# Patient Record
Sex: FEMALE | URBAN - METROPOLITAN AREA
[De-identification: names, ages, dates, MRNs, and addresses within clinical notes are randomized per-mention and may not be internally consistent; named-entity substitution may affect disease eponyms.]

---

## 2019-04-09 ENCOUNTER — APPOINTMENT (RX ONLY)
Dept: URBAN - METROPOLITAN AREA CLINIC 141 | Facility: CLINIC | Age: 54
Setting detail: DERMATOLOGY
End: 2019-04-09

## 2019-04-09 DIAGNOSIS — L71.0 PERIORAL DERMATITIS: ICD-10-CM | Status: INADEQUATELY CONTROLLED

## 2019-04-09 PROCEDURE — ? PRESCRIPTION

## 2019-04-09 PROCEDURE — 99202 OFFICE O/P NEW SF 15 MIN: CPT

## 2019-04-09 PROCEDURE — ? COUNSELING

## 2019-04-09 PROCEDURE — ? TREATMENT REGIMEN

## 2019-04-09 ASSESSMENT — LOCATION ZONE DERM
LOCATION ZONE: FACE
LOCATION ZONE: LIP

## 2019-04-09 ASSESSMENT — LOCATION DETAILED DESCRIPTION DERM
LOCATION DETAILED: RIGHT LOWER CUTANEOUS LIP
LOCATION DETAILED: RIGHT CHIN
LOCATION DETAILED: LEFT UPPER CUTANEOUS LIP
LOCATION DETAILED: RIGHT INFERIOR MEDIAL MALAR CHEEK
LOCATION DETAILED: LEFT LOWER CUTANEOUS LIP

## 2019-04-09 ASSESSMENT — INVESTIGATOR STATIC GLOBAL ASSESSMENT
IN YOUR EXPERIENCE, AMONG ALL PATIENTS YOU HAVE SEEN WITH THIS CONDITION, HOW SEVERE IS THIS PATIENT'S CONDITION?: MODERATE

## 2019-04-09 ASSESSMENT — LOCATION SIMPLE DESCRIPTION DERM
LOCATION SIMPLE: LEFT LIP
LOCATION SIMPLE: CHIN
LOCATION SIMPLE: RIGHT LIP
LOCATION SIMPLE: RIGHT CHEEK

## 2019-04-09 NOTE — HPI: RASH
What Type Of Note Output Would You Prefer (Optional)?: Standard Output
How Severe Is Your Rash?: moderate
Is This A New Presentation, Or A Follow-Up?: Rash
Additional History: Patient states she was seen at urgent care in February was given a course of Keflex. Recently has been using over the counter hydrocortisone.

## 2019-04-09 NOTE — PROCEDURE: TREATMENT REGIMEN
Initiate Treatment: doxycycline hyclate 100 mg tablet: Take one tablet by mouth twice daily for one month \\nmetronidazole 0.75 % topical cream: Apply to affected area on around mouth twice a day
Detail Level: Detailed

## 2019-04-09 NOTE — PROCEDURE: MIPS QUALITY
Quality 111:Pneumonia Vaccination Status For Older Adults: Pneumococcal Vaccination not Administered or Previously Received, Reason not Otherwise Specified
Quality 154 Part B: Falls: Risk Screening (Should Be Reported With Measure 155.): Patient screened for future fall risk; documentation of no falls in the past year or only one fall without injury in the past year
Quality 226: Preventive Care And Screening: Tobacco Use: Screening And Cessation Intervention: Patient screened for tobacco use and is an ex/non-smoker
Quality 131: Pain Assessment And Follow-Up: Pain assessment using a standardized tool is documented as negative, no follow-up plan required
Quality 155 (Denominator): Falls Plan Of Care: Plan of Care not Documented, Reason not Otherwise Specified
Quality 154 Part A: Falls: Risk Assessment (Should Be Reported With Measure 155.): Falls risk assessment completed and documented in the past 12 months.
Detail Level: Detailed
Quality 110: Preventive Care And Screening: Influenza Immunization: Influenza Immunization not Administered for Documented Reasons.
Quality 47: Advance Care Plan: Advance Care Planning discussed and documented in the medical record; patient did not wish or was not able to name a surrogate decision maker or provide an advance care plan.
Quality 431: Preventive Care And Screening: Unhealthy Alcohol Use - Screening: Patient screened for unhealthy alcohol use using a single question and scores less than 2 times per year

## 2019-04-10 RX ORDER — METRONIDAZOLE 7.5 MG/G
CREAM TOPICAL TWICE A DAY
Qty: 1 | Refills: 1 | Status: ERX | COMMUNITY
Start: 2019-04-10

## 2019-04-10 RX ORDER — DOXYCYCLINE HYCLATE 100 MG/1
CAPSULE, GELATIN COATED ORAL
Qty: 60 | Refills: 0 | Status: ERX | COMMUNITY
Start: 2019-04-10

## 2019-04-10 RX ADMIN — METRONIDAZOLE: 7.5 CREAM TOPICAL at 00:25

## 2019-04-10 RX ADMIN — DOXYCYCLINE HYCLATE: 100 CAPSULE, GELATIN COATED ORAL at 00:32

## 2019-04-30 ENCOUNTER — APPOINTMENT (RX ONLY)
Dept: URBAN - METROPOLITAN AREA CLINIC 141 | Facility: CLINIC | Age: 54
Setting detail: DERMATOLOGY
End: 2019-04-30

## 2019-04-30 DIAGNOSIS — L71.0 PERIORAL DERMATITIS: ICD-10-CM | Status: IMPROVED

## 2019-04-30 PROCEDURE — ? TREATMENT REGIMEN

## 2019-04-30 PROCEDURE — ? PRESCRIPTION

## 2019-04-30 PROCEDURE — ? COUNSELING

## 2019-04-30 PROCEDURE — 99213 OFFICE O/P EST LOW 20 MIN: CPT

## 2019-04-30 RX ORDER — DOXYCYCLINE HYCLATE 100 MG/1
CAPSULE, GELATIN COATED ORAL
Qty: 14 | Refills: 0 | Status: ERX

## 2019-04-30 ASSESSMENT — LOCATION DETAILED DESCRIPTION DERM
LOCATION DETAILED: RIGHT CHIN
LOCATION DETAILED: RIGHT LOWER CUTANEOUS LIP
LOCATION DETAILED: LEFT UPPER CUTANEOUS LIP
LOCATION DETAILED: LEFT LOWER CUTANEOUS LIP
LOCATION DETAILED: RIGHT INFERIOR MEDIAL MALAR CHEEK

## 2019-04-30 ASSESSMENT — LOCATION SIMPLE DESCRIPTION DERM
LOCATION SIMPLE: RIGHT LIP
LOCATION SIMPLE: RIGHT CHEEK
LOCATION SIMPLE: LEFT LIP
LOCATION SIMPLE: CHIN

## 2019-04-30 ASSESSMENT — LOCATION ZONE DERM
LOCATION ZONE: LIP
LOCATION ZONE: FACE

## 2019-04-30 NOTE — PROCEDURE: TREATMENT REGIMEN
Continue Regimen: doxycycline hyclate 100 mg tablet: Take one tablet by mouth twice daily for one more week, then decrease to one capsule by mouth once daily for two weeks.\\nmetronidazole 0.75 % topical cream: Apply to affected areas twice a day for two weeks, then decrease to applying cream to once daily for one week
Detail Level: Detailed

## 2019-04-30 NOTE — PROCEDURE: MIPS QUALITY
Quality 154 Part A: Falls: Risk Assessment (Should Be Reported With Measure 155.): Falls risk assessment completed and documented in the past 12 months.
Quality 111:Pneumonia Vaccination Status For Older Adults: Pneumococcal Vaccination not Administered or Previously Received, Reason not Otherwise Specified
Quality 154 Part B: Falls: Risk Screening (Should Be Reported With Measure 155.): Patient screened for future fall risk; documentation of no falls in the past year or only one fall without injury in the past year
Quality 431: Preventive Care And Screening: Unhealthy Alcohol Use - Screening: Patient screened for unhealthy alcohol use using a single question and scores less than 2 times per year
Quality 47: Advance Care Plan: Advance Care Planning discussed and documented in the medical record; patient did not wish or was not able to name a surrogate decision maker or provide an advance care plan.
Quality 110: Preventive Care And Screening: Influenza Immunization: Influenza Immunization not Administered for Documented Reasons.
Quality 131: Pain Assessment And Follow-Up: Pain assessment using a standardized tool is documented as negative, no follow-up plan required
Quality 226: Preventive Care And Screening: Tobacco Use: Screening And Cessation Intervention: Patient screened for tobacco use and is an ex/non-smoker
Detail Level: Detailed
Quality 155 (Denominator): Falls Plan Of Care: Plan of Care not Documented, Reason not Otherwise Specified

## 2021-02-18 ENCOUNTER — APPOINTMENT (RX ONLY)
Dept: URBAN - METROPOLITAN AREA CLINIC 176 | Facility: CLINIC | Age: 56
Setting detail: DERMATOLOGY
End: 2021-02-18

## 2021-02-18 VITALS — HEIGHT: 55 IN | WEIGHT: 162 LBS

## 2021-02-18 DIAGNOSIS — D18.0 HEMANGIOMA: ICD-10-CM

## 2021-02-18 DIAGNOSIS — L70.8 OTHER ACNE: ICD-10-CM

## 2021-02-18 DIAGNOSIS — L71.8 OTHER ROSACEA: ICD-10-CM | Status: INADEQUATELY CONTROLLED

## 2021-02-18 DIAGNOSIS — L57.8 OTHER SKIN CHANGES DUE TO CHRONIC EXPOSURE TO NONIONIZING RADIATION: ICD-10-CM

## 2021-02-18 DIAGNOSIS — L82.1 OTHER SEBORRHEIC KERATOSIS: ICD-10-CM

## 2021-02-18 DIAGNOSIS — Z87.2 PERSONAL HISTORY OF DISEASES OF THE SKIN AND SUBCUTANEOUS TISSUE: ICD-10-CM

## 2021-02-18 PROBLEM — D18.01 HEMANGIOMA OF SKIN AND SUBCUTANEOUS TISSUE: Status: ACTIVE | Noted: 2021-02-18

## 2021-02-18 PROCEDURE — ? COUNSELING

## 2021-02-18 PROCEDURE — ? TREATMENT REGIMEN

## 2021-02-18 PROCEDURE — ? PRESCRIPTION

## 2021-02-18 PROCEDURE — 99214 OFFICE O/P EST MOD 30 MIN: CPT

## 2021-02-18 PROCEDURE — ? OBSERVATION

## 2021-02-18 RX ORDER — SODIUM SULFACETAMIDE AND SULFUR 80; 40 MG/ML; MG/ML
LOTION TOPICAL BID
Qty: 1 | Refills: 11 | Status: ERX | COMMUNITY
Start: 2021-02-18

## 2021-02-18 RX ORDER — METRONIDAZOLE 7.5 MG/G
GEL TOPICAL BID
Qty: 1 | Refills: 11 | Status: ERX | COMMUNITY
Start: 2021-02-18

## 2021-02-18 RX ADMIN — SODIUM SULFACETAMIDE AND SULFUR: 80; 40 LOTION TOPICAL at 00:00

## 2021-02-18 RX ADMIN — METRONIDAZOLE: 7.5 GEL TOPICAL at 00:00

## 2021-02-18 ASSESSMENT — LOCATION ZONE DERM
LOCATION ZONE: NOSE
LOCATION ZONE: FACE
LOCATION ZONE: TRUNK

## 2021-02-18 ASSESSMENT — LOCATION DETAILED DESCRIPTION DERM
LOCATION DETAILED: SUPERIOR LUMBAR SPINE
LOCATION DETAILED: RIGHT INFERIOR MEDIAL UPPER BACK
LOCATION DETAILED: RIGHT MID-UPPER BACK
LOCATION DETAILED: LEFT INFERIOR MEDIAL MALAR CHEEK
LOCATION DETAILED: NASAL DORSUM
LOCATION DETAILED: LEFT SUPERIOR LATERAL MIDBACK
LOCATION DETAILED: RIGHT SUPERIOR UPPER BACK
LOCATION DETAILED: RIGHT INFERIOR CENTRAL MALAR CHEEK
LOCATION DETAILED: RIGHT RIB CAGE

## 2021-02-18 ASSESSMENT — LOCATION SIMPLE DESCRIPTION DERM
LOCATION SIMPLE: LOWER BACK
LOCATION SIMPLE: ABDOMEN
LOCATION SIMPLE: LEFT LOWER BACK
LOCATION SIMPLE: NOSE
LOCATION SIMPLE: RIGHT UPPER BACK
LOCATION SIMPLE: RIGHT CHEEK
LOCATION SIMPLE: LEFT CHEEK

## 2021-02-18 ASSESSMENT — PAIN INTENSITY VAS: HOW INTENSE IS YOUR PAIN 0 BEING NO PAIN, 10 BEING THE MOST SEVERE PAIN POSSIBLE?: NO PAIN

## 2021-02-18 ASSESSMENT — SEVERITY ASSESSMENT OVERALL AMONG ALL PATIENTS
IN YOUR EXPERIENCE, AMONG ALL PATIENTS YOU HAVE SEEN WITH THIS CONDITION, HOW SEVERE IS THIS PATIENT'S CONDITION?: MILD TO MODERATE

## 2021-02-18 NOTE — PROCEDURE: OBSERVATION
Body Location Override (Optional - Billing Will Still Be Based On Selected Body Map Location If Applicable): right upper back
Detail Level: Detailed
Size Of Lesion In Cm (Optional): 0
Morphology Per Location (Optional): 6/22/17 Mild treated with bx

## 2021-06-16 ENCOUNTER — APPOINTMENT (RX ONLY)
Dept: URBAN - METROPOLITAN AREA CLINIC 176 | Facility: CLINIC | Age: 56
Setting detail: DERMATOLOGY
End: 2021-06-16

## 2021-06-16 VITALS — WEIGHT: 165 LBS | HEIGHT: 68 IN

## 2021-06-16 DIAGNOSIS — L23.9 ALLERGIC CONTACT DERMATITIS, UNSPECIFIED CAUSE: ICD-10-CM | Status: INADEQUATELY CONTROLLED

## 2021-06-16 DIAGNOSIS — L71.8 OTHER ROSACEA: ICD-10-CM | Status: INADEQUATELY CONTROLLED

## 2021-06-16 DIAGNOSIS — L20.89 OTHER ATOPIC DERMATITIS: ICD-10-CM

## 2021-06-16 PROBLEM — L30.9 DERMATITIS, UNSPECIFIED: Status: ACTIVE | Noted: 2021-06-16

## 2021-06-16 PROCEDURE — 99214 OFFICE O/P EST MOD 30 MIN: CPT

## 2021-06-16 PROCEDURE — ? PRESCRIPTION

## 2021-06-16 PROCEDURE — ? TREATMENT REGIMEN

## 2021-06-16 PROCEDURE — ? COUNSELING

## 2021-06-16 RX ORDER — HYDROCORTISONE 25 MG/G
OINTMENT TOPICAL
Qty: 1 | Refills: 0 | Status: ERX | COMMUNITY
Start: 2021-06-16

## 2021-06-16 RX ADMIN — HYDROCORTISONE: 25 OINTMENT TOPICAL at 00:00

## 2021-06-16 ASSESSMENT — LOCATION SIMPLE DESCRIPTION DERM
LOCATION SIMPLE: RIGHT UPPER ARM
LOCATION SIMPLE: LEFT CHEEK
LOCATION SIMPLE: RIGHT EYEBROW
LOCATION SIMPLE: RIGHT CHEEK
LOCATION SIMPLE: LEFT EYEBROW

## 2021-06-16 ASSESSMENT — LOCATION DETAILED DESCRIPTION DERM
LOCATION DETAILED: RIGHT SUPERIOR CENTRAL MALAR CHEEK
LOCATION DETAILED: RIGHT ANTERIOR DISTAL UPPER ARM
LOCATION DETAILED: RIGHT LATERAL EYEBROW
LOCATION DETAILED: LEFT LATERAL EYEBROW
LOCATION DETAILED: LEFT MEDIAL BUCCAL CHEEK
LOCATION DETAILED: LEFT SUPERIOR CENTRAL MALAR CHEEK

## 2021-06-16 ASSESSMENT — SEVERITY ASSESSMENT 2020: SEVERITY 2020: MILD

## 2021-06-16 ASSESSMENT — LOCATION ZONE DERM
LOCATION ZONE: FACE
LOCATION ZONE: ARM

## 2021-06-16 NOTE — PROCEDURE: TREATMENT REGIMEN
Detail Level: Simple
Plan: Pt did not previously start metronidazole \\nPt stopped sulfacleanse when her eyelids started getting irritated
Initiate Treatment: Metronidazole
Samples Given: Vanicream soap/lotion and free and clear shampoo/conditioner
Discontinue Regimen: Eye drops
Initiate Treatment: Hydrocortisone
Initiate Treatment: Hydrocortisone ointment
Plan: Pt stopped scented soap
Samples Given: Vanicream soap and lotion

## 2021-07-07 ENCOUNTER — APPOINTMENT (RX ONLY)
Dept: URBAN - METROPOLITAN AREA CLINIC 176 | Facility: CLINIC | Age: 56
Setting detail: DERMATOLOGY
End: 2021-07-07

## 2021-07-07 VITALS — HEIGHT: 69 IN | WEIGHT: 166 LBS

## 2021-07-07 DIAGNOSIS — L71.8 OTHER ROSACEA: ICD-10-CM | Status: INADEQUATELY CONTROLLED

## 2021-07-07 DIAGNOSIS — L23.9 ALLERGIC CONTACT DERMATITIS, UNSPECIFIED CAUSE: ICD-10-CM | Status: IMPROVED

## 2021-07-07 DIAGNOSIS — L20.89 OTHER ATOPIC DERMATITIS: ICD-10-CM | Status: RESOLVED

## 2021-07-07 PROBLEM — L30.9 DERMATITIS, UNSPECIFIED: Status: ACTIVE | Noted: 2021-07-07

## 2021-07-07 PROCEDURE — ? COUNSELING

## 2021-07-07 PROCEDURE — 99214 OFFICE O/P EST MOD 30 MIN: CPT

## 2021-07-07 PROCEDURE — ? PRESCRIPTION SAMPLES PROVIDED

## 2021-07-07 PROCEDURE — ? TREATMENT REGIMEN

## 2021-07-07 ASSESSMENT — LOCATION ZONE DERM
LOCATION ZONE: FACE
LOCATION ZONE: ARM

## 2021-07-07 ASSESSMENT — LOCATION SIMPLE DESCRIPTION DERM
LOCATION SIMPLE: RIGHT CHEEK
LOCATION SIMPLE: LEFT EYEBROW
LOCATION SIMPLE: LEFT CHEEK
LOCATION SIMPLE: RIGHT EYEBROW
LOCATION SIMPLE: RIGHT UPPER ARM

## 2021-07-07 ASSESSMENT — LOCATION DETAILED DESCRIPTION DERM
LOCATION DETAILED: RIGHT ANTERIOR DISTAL UPPER ARM
LOCATION DETAILED: LEFT MEDIAL BUCCAL CHEEK
LOCATION DETAILED: RIGHT SUPERIOR CENTRAL MALAR CHEEK
LOCATION DETAILED: RIGHT LATERAL EYEBROW
LOCATION DETAILED: LEFT LATERAL EYEBROW

## 2021-07-07 NOTE — PROCEDURE: TREATMENT REGIMEN
Detail Level: Simple
Continue Regimen: Metronidazole gelQD\\nSulfacleanse
Plan: Pt has been using Sulfacleanse and metronidazole and states she has seen some improvement. \\nPt does not want to do oral abx at this time
Initiate Treatment: Finacea QD
Modify Regimen: Resolved w/ hydrocortisone. Pt can use prn
Plan: Pt stopped scented soap
Samples Given: Vanicream soap and lotion
Samples Given: Vanicream soap/lotion and free and clear shampoo/conditioner/ lotions
Plan: Pt stopped eye drops and started some that are for sensitive eyes. Pt has dry eye and cannot go w/o eye drops.
Otc Regimen: Suggest pt try Claritin daily
Discontinue Regimen: Eye drops
Continue Regimen: Hydrocortisone QHS x 5 nights

## 2021-07-29 ENCOUNTER — APPOINTMENT (RX ONLY)
Dept: URBAN - METROPOLITAN AREA CLINIC 176 | Facility: CLINIC | Age: 56
Setting detail: DERMATOLOGY
End: 2021-07-29

## 2021-07-29 VITALS — WEIGHT: 293 LBS | HEIGHT: 55 IN

## 2021-07-29 DIAGNOSIS — L23.9 ALLERGIC CONTACT DERMATITIS, UNSPECIFIED CAUSE: ICD-10-CM

## 2021-07-29 DIAGNOSIS — L71.8 OTHER ROSACEA: ICD-10-CM

## 2021-07-29 PROBLEM — L30.9 DERMATITIS, UNSPECIFIED: Status: ACTIVE | Noted: 2021-07-29

## 2021-07-29 PROCEDURE — ? COUNSELING

## 2021-07-29 PROCEDURE — ? PRESCRIPTION

## 2021-07-29 PROCEDURE — 99214 OFFICE O/P EST MOD 30 MIN: CPT

## 2021-07-29 PROCEDURE — ? TREATMENT REGIMEN

## 2021-07-29 PROCEDURE — ? ADDITIONAL NOTES

## 2021-07-29 RX ORDER — TACROLIMUS 1 MG/G
OINTMENT TOPICAL BID
Qty: 1 | Refills: 3 | Status: ERX | COMMUNITY
Start: 2021-07-29

## 2021-07-29 RX ORDER — DOXYCYCLINE 100 MG/1
CAPSULE ORAL QD
Qty: 30 | Refills: 1 | Status: ERX

## 2021-07-29 RX ADMIN — TACROLIMUS: 1 OINTMENT TOPICAL at 00:00

## 2021-07-29 ASSESSMENT — LOCATION ZONE DERM
LOCATION ZONE: EYELID
LOCATION ZONE: FACE
LOCATION ZONE: LIP

## 2021-07-29 ASSESSMENT — LOCATION DETAILED DESCRIPTION DERM
LOCATION DETAILED: RIGHT LOWER CUTANEOUS LIP
LOCATION DETAILED: RIGHT SUPERIOR CENTRAL MALAR CHEEK
LOCATION DETAILED: RIGHT LATERAL EYEBROW
LOCATION DETAILED: RIGHT SUPERIOR LATERAL MALAR CHEEK
LOCATION DETAILED: LEFT LATERAL EYEBROW
LOCATION DETAILED: RIGHT LATERAL CANTHUS
LOCATION DETAILED: LEFT MEDIAL BUCCAL CHEEK

## 2021-07-29 ASSESSMENT — LOCATION SIMPLE DESCRIPTION DERM
LOCATION SIMPLE: RIGHT CHEEK
LOCATION SIMPLE: LEFT EYEBROW
LOCATION SIMPLE: RIGHT EYEBROW
LOCATION SIMPLE: RIGHT LIP
LOCATION SIMPLE: RIGHT EYELID
LOCATION SIMPLE: LEFT CHEEK

## 2021-07-29 NOTE — PROCEDURE: TREATMENT REGIMEN
Detail Level: Simple
Continue Regimen: Metronidazole gelQD\\nSulfacleanse
Samples Given: Vanicream facial cleanser and lotion
Plan: Pt decline IMK, hopefully can get in for patch testing soon
Discontinue Regimen: Eye drops, makeup, her facewash
Continue Regimen: Hydrocortisone BID no more than 2 weeks

## 2021-07-29 NOTE — PROCEDURE: ADDITIONAL NOTES
Detail Level: Simple
Additional Notes: Ct the hydrocortisone for a couple weeks, see if the protopic is covered if covered we will switch to this, I want her to stop eye drops, makeup and current facewashes, Smaples of vanicream face wash and moisterizer given today. [facility] Handout given.
Render Risk Assessment In Note?: no

## 2021-09-09 ENCOUNTER — APPOINTMENT (RX ONLY)
Dept: URBAN - METROPOLITAN AREA CLINIC 176 | Facility: CLINIC | Age: 56
Setting detail: DERMATOLOGY
End: 2021-09-09

## 2021-09-09 VITALS — WEIGHT: 167 LBS | HEIGHT: 69 IN

## 2021-09-09 DIAGNOSIS — L23.9 ALLERGIC CONTACT DERMATITIS, UNSPECIFIED CAUSE: ICD-10-CM

## 2021-09-09 DIAGNOSIS — L71.8 OTHER ROSACEA: ICD-10-CM

## 2021-09-09 PROBLEM — L30.9 DERMATITIS, UNSPECIFIED: Status: ACTIVE | Noted: 2021-09-09

## 2021-09-09 PROCEDURE — ? TREATMENT REGIMEN

## 2021-09-09 PROCEDURE — ? COUNSELING

## 2021-09-09 PROCEDURE — 99213 OFFICE O/P EST LOW 20 MIN: CPT

## 2021-09-09 ASSESSMENT — LOCATION ZONE DERM
LOCATION ZONE: FACE
LOCATION ZONE: LIP

## 2021-09-09 ASSESSMENT — LOCATION DETAILED DESCRIPTION DERM
LOCATION DETAILED: LEFT MEDIAL BUCCAL CHEEK
LOCATION DETAILED: RIGHT SUPERIOR CENTRAL MALAR CHEEK
LOCATION DETAILED: LEFT LATERAL EYEBROW
LOCATION DETAILED: RIGHT LATERAL EYEBROW
LOCATION DETAILED: RIGHT LOWER CUTANEOUS LIP

## 2021-09-09 ASSESSMENT — LOCATION SIMPLE DESCRIPTION DERM
LOCATION SIMPLE: LEFT CHEEK
LOCATION SIMPLE: RIGHT EYEBROW
LOCATION SIMPLE: LEFT EYEBROW
LOCATION SIMPLE: RIGHT CHEEK
LOCATION SIMPLE: RIGHT LIP

## 2021-09-09 NOTE — PROCEDURE: TREATMENT REGIMEN
Detail Level: Simple
Continue Regimen: Metronidazole gelQD\\nSulfacleanse
Plan: Pt may have stopped doxy too early did not get the refill only took for one month and about 1 week after stopping has reflare, we will have her go back on stated she tolerated it well. And try the protopic/tacrolimus on the lower face.
Modify Regimen: DO NOT USE HC on the lower face
Initiate Treatment: Protopic to face QD, DOxy 100 QD
Samples Given: Vanicream facial cleanser and lotion
Plan: Eyes have cleared, she wants to re-try eye drops discussed she can but if she reflares then to stop ASAP and see her eye doctor for different drops as that means she has an allergy to something in those specific drops! \\n\\n\\nLN:Pt decline IMK, hopefully can get in for patch testing soon
Discontinue Regimen: Eye drops, makeup, her facewash
Modify Regimen: HC when flared 1-2 x daily for one week then switch to protopic , can use protopic as maintenance

## 2021-10-20 ENCOUNTER — APPOINTMENT (RX ONLY)
Dept: URBAN - METROPOLITAN AREA CLINIC 176 | Facility: CLINIC | Age: 56
Setting detail: DERMATOLOGY
End: 2021-10-20

## 2021-10-20 VITALS — HEIGHT: 69 IN | WEIGHT: 169 LBS

## 2021-10-20 DIAGNOSIS — L81.4 OTHER MELANIN HYPERPIGMENTATION: ICD-10-CM

## 2021-10-20 DIAGNOSIS — L57.8 OTHER SKIN CHANGES DUE TO CHRONIC EXPOSURE TO NONIONIZING RADIATION: ICD-10-CM

## 2021-10-20 DIAGNOSIS — L71.8 OTHER ROSACEA: ICD-10-CM | Status: WELL CONTROLLED

## 2021-10-20 DIAGNOSIS — L82.1 OTHER SEBORRHEIC KERATOSIS: ICD-10-CM

## 2021-10-20 DIAGNOSIS — Z87.2 PERSONAL HISTORY OF DISEASES OF THE SKIN AND SUBCUTANEOUS TISSUE: ICD-10-CM

## 2021-10-20 DIAGNOSIS — R23.3 SPONTANEOUS ECCHYMOSES: ICD-10-CM | Status: RESOLVED

## 2021-10-20 DIAGNOSIS — L23.9 ALLERGIC CONTACT DERMATITIS, UNSPECIFIED CAUSE: ICD-10-CM

## 2021-10-20 DIAGNOSIS — D22 MELANOCYTIC NEVI: ICD-10-CM

## 2021-10-20 PROBLEM — L30.9 DERMATITIS, UNSPECIFIED: Status: ACTIVE | Noted: 2021-10-20

## 2021-10-20 PROBLEM — D22.5 MELANOCYTIC NEVI OF TRUNK: Status: ACTIVE | Noted: 2021-10-20

## 2021-10-20 PROBLEM — D22.61 MELANOCYTIC NEVI OF RIGHT UPPER LIMB, INCLUDING SHOULDER: Status: ACTIVE | Noted: 2021-10-20

## 2021-10-20 PROCEDURE — ? OBSERVATION

## 2021-10-20 PROCEDURE — ? COUNSELING

## 2021-10-20 PROCEDURE — 99213 OFFICE O/P EST LOW 20 MIN: CPT

## 2021-10-20 PROCEDURE — ? TREATMENT REGIMEN

## 2021-10-20 ASSESSMENT — LOCATION DETAILED DESCRIPTION DERM
LOCATION DETAILED: LEFT RIB CAGE
LOCATION DETAILED: RIGHT SUPERIOR CENTRAL MALAR CHEEK
LOCATION DETAILED: RIGHT POSTERIOR SHOULDER
LOCATION DETAILED: RIGHT ANTERIOR MEDIAL PROXIMAL UPPER ARM
LOCATION DETAILED: RIGHT LOWER CUTANEOUS LIP
LOCATION DETAILED: LEFT LATERAL EYEBROW
LOCATION DETAILED: RIGHT SUPERIOR UPPER BACK
LOCATION DETAILED: RIGHT LATERAL EYEBROW
LOCATION DETAILED: LEFT MEDIAL BUCCAL CHEEK
LOCATION DETAILED: LEFT INFERIOR UPPER BACK
LOCATION DETAILED: LEFT LATERAL SUPERIOR CHEST
LOCATION DETAILED: RIGHT SUPERIOR MEDIAL MIDBACK

## 2021-10-20 ASSESSMENT — LOCATION SIMPLE DESCRIPTION DERM
LOCATION SIMPLE: CHEST
LOCATION SIMPLE: RIGHT SHOULDER
LOCATION SIMPLE: RIGHT CHEEK
LOCATION SIMPLE: LEFT UPPER BACK
LOCATION SIMPLE: RIGHT LIP
LOCATION SIMPLE: RIGHT UPPER ARM
LOCATION SIMPLE: RIGHT UPPER BACK
LOCATION SIMPLE: RIGHT LOWER BACK
LOCATION SIMPLE: ABDOMEN
LOCATION SIMPLE: LEFT CHEEK
LOCATION SIMPLE: LEFT EYEBROW
LOCATION SIMPLE: RIGHT EYEBROW

## 2021-10-20 ASSESSMENT — LOCATION ZONE DERM
LOCATION ZONE: FACE
LOCATION ZONE: TRUNK
LOCATION ZONE: ARM
LOCATION ZONE: ARM
LOCATION ZONE: LIP

## 2021-10-20 NOTE — PROCEDURE: TREATMENT REGIMEN
Detail Level: Simple
Continue Regimen: Sulfacleanse
Modify Regimen: DO NOT USE HC on the lower face
Initiate Treatment: Tacrolimus to face PRN flares
Samples Given: Vanicream facial cleanser and lotion
Plan: Some dryness around the are eyes, recommend moisturization. \\n\\nLN: \\nEyes have cleared, she wants to re-try eye drops discussed she can but if she reflares then to stop ASAP and see her eye doctor for different drops as that means she has an allergy to something in those specific drops! \\n\\n\\nLN:Pt decline IMK, hopefully can get in for patch testing soon
Discontinue Regimen: Eye drops, makeup, her facewash
Modify Regimen: HC when flared 1-2 x daily for one week then switch to protopic , can use protopic as maintenance

## 2021-10-20 NOTE — HPI: EVALUATION OF SKIN LESION(S)
What Type Of Note Output Would You Prefer (Optional)?: Bullet Format
How Severe Are Your Spot(S)?: mild
Have Your Spot(S) Been Treated In The Past?: has been treated
Hpi Title: Evaluation of a Skin Lesion
Additional History: Used tacrolimus ointment and cleared it up. No longer in discomfort. UBE.

## 2021-10-20 NOTE — PROCEDURE: COUNSELING
Detail Level: Zone
Detail Level: Detailed
Patient Specific Counseling (Will Not Stick From Patient To Patient): Patient had showed pictures on phone, recommended to come if reoccurring.
Detail Level: Generalized

## 2021-11-05 ENCOUNTER — APPOINTMENT (RX ONLY)
Dept: URBAN - METROPOLITAN AREA CLINIC 176 | Facility: CLINIC | Age: 56
Setting detail: DERMATOLOGY
End: 2021-11-05

## 2021-11-05 VITALS — WEIGHT: 164 LBS | HEIGHT: 69 IN

## 2021-11-05 DIAGNOSIS — L01.01 NON-BULLOUS IMPETIGO: ICD-10-CM

## 2021-11-05 DIAGNOSIS — L71.8 OTHER ROSACEA: ICD-10-CM

## 2021-11-05 DIAGNOSIS — L57.8 OTHER SKIN CHANGES DUE TO CHRONIC EXPOSURE TO NONIONIZING RADIATION: ICD-10-CM

## 2021-11-05 PROBLEM — L30.9 DERMATITIS, UNSPECIFIED: Status: ACTIVE | Noted: 2021-11-05

## 2021-11-05 PROCEDURE — ? ADDITIONAL NOTES

## 2021-11-05 PROCEDURE — ? ORDER TESTS

## 2021-11-05 PROCEDURE — ? COUNSELING

## 2021-11-05 PROCEDURE — ? TREATMENT REGIMEN

## 2021-11-05 PROCEDURE — ? PRESCRIPTION

## 2021-11-05 PROCEDURE — 99213 OFFICE O/P EST LOW 20 MIN: CPT

## 2021-11-05 RX ORDER — MUPIROCIN 20 MG/G
OINTMENT TOPICAL BID
Qty: 22 | Refills: 1 | Status: ERX | COMMUNITY
Start: 2021-11-05

## 2021-11-05 RX ORDER — DOXYCYCLINE 100 MG/1
CAPSULE ORAL
Qty: 60 | Refills: 1 | Status: ERX

## 2021-11-05 RX ADMIN — MUPIROCIN: 20 OINTMENT TOPICAL at 00:00

## 2021-11-05 ASSESSMENT — LOCATION SIMPLE DESCRIPTION DERM
LOCATION SIMPLE: RIGHT LIP
LOCATION SIMPLE: CHIN
LOCATION SIMPLE: LEFT FOREHEAD
LOCATION SIMPLE: LEFT CHEEK

## 2021-11-05 ASSESSMENT — LOCATION DETAILED DESCRIPTION DERM
LOCATION DETAILED: RIGHT LOWER CUTANEOUS LIP
LOCATION DETAILED: RIGHT CHIN
LOCATION DETAILED: LEFT MEDIAL FOREHEAD
LOCATION DETAILED: LEFT MEDIAL BUCCAL CHEEK

## 2021-11-05 ASSESSMENT — LOCATION ZONE DERM
LOCATION ZONE: FACE
LOCATION ZONE: LIP

## 2021-11-05 NOTE — PROCEDURE: TREATMENT REGIMEN
Detail Level: Simple
Plan: May restart on tacrolimus and sulfacleanse once other rash is calmed down. We will also taper SLOWLY off of DOXYCYLINE. We will go from 200 QD to 100 then to 50 then to 40 as she did mention she flares when she stops cold turkey.
Modify Regimen: DO NOT USE HC on the lower face
Discontinue Regimen: Hold tacrolimus and sulfacleanse for right now, very inflammed and irritated today with some yellow crusting so maybe some infection there today unsure what triggered this was clear a couple weeks ago this is something acute

## 2021-11-05 NOTE — PROCEDURE: ORDER TESTS
Clinical Notes (To The Lab): Add sensitivity
Bill For Surgical Tray: no
Billing Type: Third-Party Bill
Expected Date Of Service: 11/05/2021
Performing Laboratory: [id number]

## 2021-11-05 NOTE — PROCEDURE: ADDITIONAL NOTES
Detail Level: Detailed
Render Risk Assessment In Note?: no
Additional Notes: Pt concern with her fleece sheets as she states this is the only new exposure. Change sheets until we get this figured out. Discussed I want her to get patch tested to see if there is something else she is coming into contact with that is causing these rashes.

## 2021-11-12 ENCOUNTER — APPOINTMENT (RX ONLY)
Dept: URBAN - METROPOLITAN AREA CLINIC 176 | Facility: CLINIC | Age: 56
Setting detail: DERMATOLOGY
End: 2021-11-12

## 2021-11-12 VITALS — WEIGHT: 165 LBS | HEIGHT: 69 IN

## 2021-11-12 DIAGNOSIS — L71.8 OTHER ROSACEA: ICD-10-CM

## 2021-11-12 DIAGNOSIS — L57.8 OTHER SKIN CHANGES DUE TO CHRONIC EXPOSURE TO NONIONIZING RADIATION: ICD-10-CM

## 2021-11-12 DIAGNOSIS — L01.01 NON-BULLOUS IMPETIGO: ICD-10-CM | Status: RESOLVING

## 2021-11-12 PROBLEM — L30.9 DERMATITIS, UNSPECIFIED: Status: ACTIVE | Noted: 2021-11-12

## 2021-11-12 PROCEDURE — ? TREATMENT REGIMEN

## 2021-11-12 PROCEDURE — ? COUNSELING

## 2021-11-12 PROCEDURE — 99213 OFFICE O/P EST LOW 20 MIN: CPT

## 2021-11-12 PROCEDURE — ? ADDITIONAL NOTES

## 2021-11-12 ASSESSMENT — LOCATION SIMPLE DESCRIPTION DERM
LOCATION SIMPLE: RIGHT LIP
LOCATION SIMPLE: LEFT CHEEK
LOCATION SIMPLE: LEFT FOREHEAD

## 2021-11-12 ASSESSMENT — LOCATION ZONE DERM
LOCATION ZONE: FACE
LOCATION ZONE: LIP

## 2021-11-12 ASSESSMENT — LOCATION DETAILED DESCRIPTION DERM
LOCATION DETAILED: LEFT MEDIAL FOREHEAD
LOCATION DETAILED: RIGHT LOWER CUTANEOUS LIP
LOCATION DETAILED: LEFT MEDIAL BUCCAL CHEEK

## 2021-11-12 NOTE — PROCEDURE: ADDITIONAL NOTES
Detail Level: Detailed
Render Risk Assessment In Note?: no
Additional Notes: Much improved with treatment. Continue doxy BID x 3 weeks, then we will decrease to nice daily. \\n\\nLast note: \\nPt concern with her fleece sheets as she states this is the only new exposure. Change sheets until we get this figured out. Discussed I want her to get patch tested to see if there is something else she is coming into contact with that is causing these rashes.

## 2021-11-12 NOTE — PROCEDURE: TREATMENT REGIMEN
Detail Level: Simple
Plan: May restart on tacrolimus and sulfacleanse once other rash is calmed down. We will also taper SLOWLY off of DOXYCYLINE. We will go from 200 QD to 100 then to 50 then to 40 as she did mention she flares when she stops cold turkey.
Modify Regimen: DO NOT USE HC on the lower face

## 2021-11-30 ENCOUNTER — APPOINTMENT (RX ONLY)
Dept: URBAN - METROPOLITAN AREA CLINIC 176 | Facility: CLINIC | Age: 56
Setting detail: DERMATOLOGY
End: 2021-11-30

## 2021-11-30 VITALS — HEIGHT: 69 IN | WEIGHT: 160 LBS

## 2021-11-30 DIAGNOSIS — L71.8 OTHER ROSACEA: ICD-10-CM | Status: IMPROVED

## 2021-11-30 DIAGNOSIS — L57.8 OTHER SKIN CHANGES DUE TO CHRONIC EXPOSURE TO NONIONIZING RADIATION: ICD-10-CM

## 2021-11-30 PROBLEM — L30.9 DERMATITIS, UNSPECIFIED: Status: ACTIVE | Noted: 2021-11-30

## 2021-11-30 PROCEDURE — ? TREATMENT REGIMEN

## 2021-11-30 PROCEDURE — 99213 OFFICE O/P EST LOW 20 MIN: CPT

## 2021-11-30 PROCEDURE — ? PRESCRIPTION

## 2021-11-30 PROCEDURE — ? COUNSELING

## 2021-11-30 RX ORDER — DOXYCYCLINE HYCLATE 20 MG/1
TABLET, FILM COATED ORAL
Qty: 60 | Refills: 6 | Status: ERX

## 2021-11-30 ASSESSMENT — LOCATION SIMPLE DESCRIPTION DERM
LOCATION SIMPLE: LEFT FOREHEAD
LOCATION SIMPLE: LEFT CHEEK
LOCATION SIMPLE: RIGHT LIP

## 2021-11-30 ASSESSMENT — LOCATION ZONE DERM
LOCATION ZONE: FACE
LOCATION ZONE: LIP

## 2021-11-30 NOTE — PROCEDURE: TREATMENT REGIMEN
Detail Level: Simple
Plan: SLow doxy taper, ok to use tacrolimus if getting some breakouts or itching. RTC if flares.
Modify Regimen: DO NOT USE HC on the lower face
Initiate Treatment: DOxy QD 100mg x 1month, then switch to doxy 40mg QD x 3 months
Discontinue Regimen: DOxy BID

## 2022-03-04 ENCOUNTER — APPOINTMENT (RX ONLY)
Dept: URBAN - METROPOLITAN AREA CLINIC 176 | Facility: CLINIC | Age: 57
Setting detail: DERMATOLOGY
End: 2022-03-04

## 2022-03-04 VITALS — HEIGHT: 68 IN | WEIGHT: 170 LBS

## 2022-03-04 DIAGNOSIS — L71.8 OTHER ROSACEA: ICD-10-CM

## 2022-03-04 DIAGNOSIS — R21 RASH AND OTHER NONSPECIFIC SKIN ERUPTION: ICD-10-CM

## 2022-03-04 DIAGNOSIS — L57.8 OTHER SKIN CHANGES DUE TO CHRONIC EXPOSURE TO NONIONIZING RADIATION: ICD-10-CM

## 2022-03-04 PROBLEM — L30.9 DERMATITIS, UNSPECIFIED: Status: ACTIVE | Noted: 2022-03-04

## 2022-03-04 PROCEDURE — 99214 OFFICE O/P EST MOD 30 MIN: CPT | Mod: 25

## 2022-03-04 PROCEDURE — 11104 PUNCH BX SKIN SINGLE LESION: CPT

## 2022-03-04 PROCEDURE — ? TREATMENT REGIMEN

## 2022-03-04 PROCEDURE — ? ADDITIONAL NOTES

## 2022-03-04 PROCEDURE — ? PRESCRIPTION

## 2022-03-04 PROCEDURE — ? ORDER TESTS

## 2022-03-04 PROCEDURE — ? COUNSELING

## 2022-03-04 PROCEDURE — ? BIOPSY BY PUNCH METHOD

## 2022-03-04 RX ORDER — DOXYCYCLINE 100 MG/1
CAPSULE ORAL QD
Qty: 60 | Refills: 1 | Status: ERX

## 2022-03-04 RX ORDER — TACROLIMUS 1 MG/G
OINTMENT TOPICAL
Qty: 30 | Refills: 3 | Status: ERX | COMMUNITY
Start: 2022-03-04

## 2022-03-04 RX ADMIN — TACROLIMUS: 1 OINTMENT TOPICAL at 00:00

## 2022-03-04 ASSESSMENT — LOCATION ZONE DERM
LOCATION ZONE: LEG
LOCATION ZONE: LIP
LOCATION ZONE: FACE

## 2022-03-04 ASSESSMENT — LOCATION DETAILED DESCRIPTION DERM
LOCATION DETAILED: LEFT PROXIMAL PRETIBIAL REGION
LOCATION DETAILED: RIGHT LOWER CUTANEOUS LIP
LOCATION DETAILED: LEFT MEDIAL BUCCAL CHEEK
LOCATION DETAILED: LEFT MEDIAL FOREHEAD

## 2022-03-04 ASSESSMENT — LOCATION SIMPLE DESCRIPTION DERM
LOCATION SIMPLE: RIGHT LIP
LOCATION SIMPLE: LEFT PRETIBIAL REGION
LOCATION SIMPLE: LEFT CHEEK
LOCATION SIMPLE: LEFT FOREHEAD

## 2022-03-04 NOTE — PROCEDURE: BIOPSY BY PUNCH METHOD
Detail Level: Detailed
Was A Bandage Applied: Yes
Punch Size In Mm: 3
Biopsy Type: H and E
Anesthesia Type: 1% lidocaine with epinephrine
Anesthesia Volume In Cc (Will Not Render If 0): 0.5
Additional Anesthesia Volume In Cc (Will Not Render If 0): 0
Hemostasis: None
Epidermal Sutures: 4-0 Nylon
Wound Care: Petrolatum
Dressing: bandage
Suture Removal: 14 days
Patient Will Remove Sutures At Home?: No
Lab: [id number]
Lab Facility: [id number]
Consent: Verbal consent was obtained and risks were reviewed including but not limited to scarring, infection, bleeding, scabbing, incomplete removal, nerve damage and allergy to anesthesia.
Post-Care Instructions: I reviewed with the patient in detail post-care instructions. Patient is to keep the biopsy site dry overnight, and then cleanse daily and apply polysporin or aquaphor twice daily until healed. Avoid excessive movement in suture area to avoid pulling on the sutures.
Home Suture Removal Text: Patient was provided a home suture removal kit and will remove their sutures at home.  If they have any questions or difficulties they will call the office.
Notification Instructions: Patient will be notified of biopsy results. However, patient instructed to call the office if not contacted within 2 weeks.
Billing Type: Third-Party Bill
Information: Selecting Yes will display possible errors in your note based on the variables you have selected. This validation is only offered as a suggestion for you. PLEASE NOTE THAT THE VALIDATION TEXT WILL BE REMOVED WHEN YOU FINALIZE YOUR NOTE. IF YOU WANT TO FAX A PRELIMINARY NOTE YOU WILL NEED TO TOGGLE THIS TO 'NO' IF YOU DO NOT WANT IT IN YOUR FAXED NOTE.

## 2022-03-04 NOTE — PROCEDURE: ORDER TESTS
Lab Facility: [id number]
Expected Date Of Service: 03/04/2022
Performing Laboratory: [id number]
Billing Type: Third-Party Bill
Bill For Surgical Tray: no

## 2022-03-04 NOTE — PROCEDURE: ADDITIONAL NOTES
Detail Level: Detailed
Render Risk Assessment In Note?: no
Additional Notes: Blood work was about done 3-4 months ago. Will give her a new slip for blood work, patient concerned this is something internal, looks like Purpera on the lower legs, in an unusual pattern, wondering if bug bites, breakfast lunch and dinner that turned into Purpera, but she never complained of any itching in the area or any swollen bug bite type lesions, said she checked her bed and did not see anything, does not have any pets so not thinking anything like fleas. Biopsy done today to give us some more information

## 2022-03-04 NOTE — PROCEDURE: TREATMENT REGIMEN
Detail Level: Simple
Plan: SLow doxy taper, ok to use tacrolimus if getting some breakouts or itching. RTC if flares.
Modify Regimen: DO NOT USE HC on the lower face
Initiate Treatment: DOxy BID x 2 weeks then QD 100mg x 1month, then switch to doxy 40mg QD x 3 months\\nAdd protopic BID

## 2022-03-18 ENCOUNTER — APPOINTMENT (RX ONLY)
Dept: URBAN - METROPOLITAN AREA CLINIC 176 | Facility: CLINIC | Age: 57
Setting detail: DERMATOLOGY
End: 2022-03-18

## 2022-03-18 VITALS — HEIGHT: 68 IN | WEIGHT: 170 LBS

## 2022-03-18 DIAGNOSIS — L57.8 OTHER SKIN CHANGES DUE TO CHRONIC EXPOSURE TO NONIONIZING RADIATION: ICD-10-CM

## 2022-03-18 DIAGNOSIS — R21 RASH AND OTHER NONSPECIFIC SKIN ERUPTION: ICD-10-CM

## 2022-03-18 DIAGNOSIS — Z48.02 ENCOUNTER FOR REMOVAL OF SUTURES: ICD-10-CM

## 2022-03-18 PROCEDURE — 99213 OFFICE O/P EST LOW 20 MIN: CPT

## 2022-03-18 PROCEDURE — ? SUTURE REMOVAL (GLOBAL PERIOD)

## 2022-03-18 PROCEDURE — ? COUNSELING

## 2022-03-18 PROCEDURE — ? ADDITIONAL NOTES

## 2022-03-18 ASSESSMENT — LOCATION DETAILED DESCRIPTION DERM
LOCATION DETAILED: LEFT DISTAL PRETIBIAL REGION
LOCATION DETAILED: LEFT MEDIAL FOREHEAD

## 2022-03-18 ASSESSMENT — LOCATION ZONE DERM
LOCATION ZONE: FACE
LOCATION ZONE: LEG

## 2022-03-18 ASSESSMENT — LOCATION SIMPLE DESCRIPTION DERM
LOCATION SIMPLE: LEFT PRETIBIAL REGION
LOCATION SIMPLE: LEFT FOREHEAD

## 2022-03-18 NOTE — PROCEDURE: ADDITIONAL NOTES
Detail Level: Detailed
Render Risk Assessment In Note?: no
Additional Notes: Rash is resolved. Reviewed bloodwork and was WNL, bx shows PPD vs dermal hypersensitivity reaction, discussed may have been bites does not look like PPD so will monitor for now. \\n\\nLN:\\nBlood work was about done 3-4 months ago. Will give her a new slip for blood work, patient concerned this is something internal, looks like Purpera on the lower legs, in an unusual pattern, wondering if bug bites, breakfast lunch and dinner that turned into Purpera, but she never complained of any itching in the area or any swollen bug bite type lesions, said she checked her bed and did not see anything, does not have any pets so not thinking anything like fleas. Biopsy done today to give us some more information

## 2022-10-12 ENCOUNTER — APPOINTMENT (RX ONLY)
Dept: URBAN - METROPOLITAN AREA CLINIC 176 | Facility: CLINIC | Age: 57
Setting detail: DERMATOLOGY
End: 2022-10-12

## 2022-10-12 VITALS — HEIGHT: 68 IN | WEIGHT: 170 LBS

## 2022-10-12 DIAGNOSIS — D22 MELANOCYTIC NEVI: ICD-10-CM

## 2022-10-12 DIAGNOSIS — Z87.2 PERSONAL HISTORY OF DISEASES OF THE SKIN AND SUBCUTANEOUS TISSUE: ICD-10-CM

## 2022-10-12 DIAGNOSIS — R21 RASH AND OTHER NONSPECIFIC SKIN ERUPTION: ICD-10-CM

## 2022-10-12 DIAGNOSIS — L82.1 OTHER SEBORRHEIC KERATOSIS: ICD-10-CM

## 2022-10-12 DIAGNOSIS — L81.4 OTHER MELANIN HYPERPIGMENTATION: ICD-10-CM

## 2022-10-12 DIAGNOSIS — L57.8 OTHER SKIN CHANGES DUE TO CHRONIC EXPOSURE TO NONIONIZING RADIATION: ICD-10-CM

## 2022-10-12 DIAGNOSIS — L73.8 OTHER SPECIFIED FOLLICULAR DISORDERS: ICD-10-CM

## 2022-10-12 PROBLEM — D22.5 MELANOCYTIC NEVI OF TRUNK: Status: ACTIVE | Noted: 2022-10-12

## 2022-10-12 PROBLEM — D22.61 MELANOCYTIC NEVI OF RIGHT UPPER LIMB, INCLUDING SHOULDER: Status: ACTIVE | Noted: 2022-10-12

## 2022-10-12 PROCEDURE — 10040 EXTRACTION: CPT

## 2022-10-12 PROCEDURE — ? ACNE SURGERY

## 2022-10-12 PROCEDURE — ? ADDITIONAL NOTES

## 2022-10-12 PROCEDURE — ? OBSERVATION

## 2022-10-12 PROCEDURE — ? COUNSELING

## 2022-10-12 PROCEDURE — 99213 OFFICE O/P EST LOW 20 MIN: CPT | Mod: 25

## 2022-10-12 ASSESSMENT — LOCATION DETAILED DESCRIPTION DERM
LOCATION DETAILED: RIGHT SUPERIOR MEDIAL MIDBACK
LOCATION DETAILED: LEFT DISTAL PRETIBIAL REGION
LOCATION DETAILED: LEFT RIB CAGE
LOCATION DETAILED: LEFT LATERAL SUPERIOR CHEST
LOCATION DETAILED: RIGHT ANTERIOR PROXIMAL THIGH
LOCATION DETAILED: RIGHT POSTERIOR SHOULDER
LOCATION DETAILED: LEFT INFERIOR UPPER BACK
LOCATION DETAILED: RIGHT MEDIAL SUPERIOR CHEST
LOCATION DETAILED: RIGHT SUPERIOR UPPER BACK

## 2022-10-12 ASSESSMENT — LOCATION SIMPLE DESCRIPTION DERM
LOCATION SIMPLE: LEFT PRETIBIAL REGION
LOCATION SIMPLE: CHEST
LOCATION SIMPLE: ABDOMEN
LOCATION SIMPLE: RIGHT UPPER BACK
LOCATION SIMPLE: LEFT UPPER BACK
LOCATION SIMPLE: RIGHT LOWER BACK
LOCATION SIMPLE: RIGHT SHOULDER
LOCATION SIMPLE: RIGHT THIGH

## 2022-10-12 ASSESSMENT — LOCATION ZONE DERM
LOCATION ZONE: ARM
LOCATION ZONE: TRUNK
LOCATION ZONE: LEG

## 2022-10-12 NOTE — PROCEDURE: ADDITIONAL NOTES
Detail Level: Detailed
Render Risk Assessment In Note?: no
Additional Notes: Comes and goes, flat pink patches, not symptomatic or bothersome, RTC if changes.

## 2022-10-12 NOTE — PROCEDURE: ACNE SURGERY
Extraction Method: 18 gauge needle and comedo extractor
Post-Care Instructions: I reviewed with the patient in detail post-care instructions. Patient is to keep the treatment areas dry overnight, and then apply bacitracin twice daily until healed. Patient may apply hydrogen peroxide soaks to remove any crusting.
Render Number Of Lesions Treated: no
Detail Level: Detailed
Prep Text (Optional): Prior to removal the treatment areas were prepped in the usual fashion.
Consent was obtained and risks were reviewed including but not limited to scarring, infection, bleeding, scabbing, incomplete removal, and allergy to anesthesia.
Acne Type: Comedonal Lesions

## 2022-12-12 ENCOUNTER — APPOINTMENT (RX ONLY)
Dept: URBAN - METROPOLITAN AREA CLINIC 176 | Facility: CLINIC | Age: 57
Setting detail: DERMATOLOGY
End: 2022-12-12

## 2022-12-12 VITALS — HEIGHT: 68 IN | WEIGHT: 179 LBS

## 2022-12-12 DIAGNOSIS — L57.8 OTHER SKIN CHANGES DUE TO CHRONIC EXPOSURE TO NONIONIZING RADIATION: ICD-10-CM

## 2022-12-12 DIAGNOSIS — L71.0 PERIORAL DERMATITIS: ICD-10-CM

## 2022-12-12 PROCEDURE — ? PRESCRIPTION MEDICATION MANAGEMENT

## 2022-12-12 PROCEDURE — ? PRESCRIPTION

## 2022-12-12 PROCEDURE — 99214 OFFICE O/P EST MOD 30 MIN: CPT

## 2022-12-12 PROCEDURE — ? PHOTO-DOCUMENTATION

## 2022-12-12 PROCEDURE — ? COUNSELING

## 2022-12-12 RX ORDER — METRONIDAZOLE 7.5 MG/G
CREAM TOPICAL
Qty: 45 | Refills: 1 | Status: ERX | COMMUNITY
Start: 2022-12-12

## 2022-12-12 RX ORDER — DOXYCYCLINE HYCLATE 100 MG/1
CAPSULE, GELATIN COATED ORAL
Qty: 60 | Refills: 2 | Status: ERX | COMMUNITY
Start: 2022-12-12

## 2022-12-12 RX ADMIN — DOXYCYCLINE HYCLATE: 100 CAPSULE, GELATIN COATED ORAL at 00:00

## 2022-12-12 RX ADMIN — METRONIDAZOLE: 7.5 CREAM TOPICAL at 00:00

## 2022-12-12 ASSESSMENT — LOCATION DETAILED DESCRIPTION DERM
LOCATION DETAILED: RIGHT MEDIAL BUCCAL CHEEK
LOCATION DETAILED: LEFT MEDIAL BUCCAL CHEEK

## 2022-12-12 ASSESSMENT — LOCATION SIMPLE DESCRIPTION DERM
LOCATION SIMPLE: RIGHT CHEEK
LOCATION SIMPLE: LEFT CHEEK

## 2022-12-12 ASSESSMENT — LOCATION ZONE DERM: LOCATION ZONE: FACE

## 2022-12-12 NOTE — PROCEDURE: PHOTO-DOCUMENTATION
Photo Preface (Leave Blank If You Do Not Want): Photographs were obtained today for monitoring/comparison purposes.
Detail Level: Zone

## 2022-12-12 NOTE — PROCEDURE: PRESCRIPTION MEDICATION MANAGEMENT
Detail Level: Zone
Render In Strict Bullet Format?: No
Initiate Treatment: doxycycline hyclate 100 mg capsule Take 1 po bid until rash clears then decrease to qd x 1-2 months. Take with food and a full glass of water. Do not lie down for 1 hour after taking.\\n\\nmetronidazole 0.75 % topical cream Apply to the affected area bid
Continue Regimen: Protopic bid for maintenance (pt has a prescription)
Samples Given: CeraVe/Cetaphil gentle cleanser and lotion.
Plan: F/U in 3-4 months

## 2023-03-15 ENCOUNTER — APPOINTMENT (RX ONLY)
Dept: URBAN - METROPOLITAN AREA CLINIC 176 | Facility: CLINIC | Age: 58
Setting detail: DERMATOLOGY
End: 2023-03-15

## 2023-03-15 VITALS — WEIGHT: 179 LBS | HEIGHT: 68 IN

## 2023-03-15 DIAGNOSIS — L71.0 PERIORAL DERMATITIS: ICD-10-CM | Status: RESOLVED

## 2023-03-15 PROCEDURE — ? PRESCRIPTION MEDICATION MANAGEMENT

## 2023-03-15 PROCEDURE — 99213 OFFICE O/P EST LOW 20 MIN: CPT

## 2023-03-15 PROCEDURE — ? COUNSELING

## 2023-03-15 PROCEDURE — ? PHOTO-DOCUMENTATION

## 2023-03-15 ASSESSMENT — LOCATION DETAILED DESCRIPTION DERM
LOCATION DETAILED: LEFT MEDIAL BUCCAL CHEEK
LOCATION DETAILED: RIGHT MEDIAL BUCCAL CHEEK

## 2023-03-15 ASSESSMENT — LOCATION SIMPLE DESCRIPTION DERM
LOCATION SIMPLE: RIGHT CHEEK
LOCATION SIMPLE: LEFT CHEEK

## 2023-03-15 ASSESSMENT — LOCATION ZONE DERM: LOCATION ZONE: FACE

## 2023-03-15 NOTE — PROCEDURE: PRESCRIPTION MEDICATION MANAGEMENT
Detail Level: Zone
Render In Strict Bullet Format?: No
Discontinue Regimen: Doxycycline \\nProtopic, may resume prn for flares \\nMetronidazole, may resume prn for flares
Plan: Discussed that this condition can recur

## 2023-05-26 ENCOUNTER — APPOINTMENT (RX ONLY)
Dept: URBAN - METROPOLITAN AREA CLINIC 176 | Facility: CLINIC | Age: 58
Setting detail: DERMATOLOGY
End: 2023-05-26

## 2023-05-26 VITALS — HEIGHT: 68 IN | WEIGHT: 173 LBS

## 2023-05-26 DIAGNOSIS — R21 RASH AND OTHER NONSPECIFIC SKIN ERUPTION: ICD-10-CM

## 2023-05-26 DIAGNOSIS — R20.2 PARESTHESIA OF SKIN: ICD-10-CM

## 2023-05-26 DIAGNOSIS — L71.0 PERIORAL DERMATITIS: ICD-10-CM | Status: WELL CONTROLLED

## 2023-05-26 PROCEDURE — ? PRESCRIPTION MEDICATION MANAGEMENT

## 2023-05-26 PROCEDURE — ? ADDITIONAL NOTES

## 2023-05-26 PROCEDURE — ? COUNSELING

## 2023-05-26 PROCEDURE — 99214 OFFICE O/P EST MOD 30 MIN: CPT

## 2023-05-26 ASSESSMENT — LOCATION SIMPLE DESCRIPTION DERM
LOCATION SIMPLE: RIGHT CHEEK
LOCATION SIMPLE: RIGHT UPPER BACK
LOCATION SIMPLE: LEFT THIGH

## 2023-05-26 ASSESSMENT — LOCATION DETAILED DESCRIPTION DERM
LOCATION DETAILED: LEFT ANTERIOR PROXIMAL THIGH
LOCATION DETAILED: RIGHT SUPERIOR UPPER BACK
LOCATION DETAILED: RIGHT CENTRAL BUCCAL CHEEK

## 2023-05-26 ASSESSMENT — LOCATION ZONE DERM
LOCATION ZONE: LEG
LOCATION ZONE: FACE
LOCATION ZONE: TRUNK

## 2023-05-26 NOTE — PROCEDURE: PRESCRIPTION MEDICATION MANAGEMENT
Plan: If not improving can do topical capsaicin
Detail Level: Zone
Render In Strict Bullet Format?: No
Initiate Treatment: Sample of impoyz apply BID x 2 weeks for itching on. Back

## 2023-05-26 NOTE — PROCEDURE: ADDITIONAL NOTES
Detail Level: Detailed
Additional Notes: Much improved switched body washes and has improved no longer red and itching just some PIH today, use sensitive skin products
Render Risk Assessment In Note?: no

## 2023-05-26 NOTE — HPI: RASH
What Type Of Note Output Would You Prefer (Optional)?: Bullet Format
How Severe Is Your Rash?: mild
Is This A New Presentation, Or A Follow-Up?: Rash
Additional History: Patient got. A cheap body wash fro [facility] and broke out in a rash in her groin area. She switched to Cetaphil genial cleanser and rash has improved. She also complains of a dry patch on the right shoulder.

## 2023-10-13 ENCOUNTER — APPOINTMENT (RX ONLY)
Dept: URBAN - METROPOLITAN AREA CLINIC 176 | Facility: CLINIC | Age: 58
Setting detail: DERMATOLOGY
End: 2023-10-13

## 2023-10-13 VITALS — HEIGHT: 68 IN | WEIGHT: 174 LBS

## 2023-10-13 DIAGNOSIS — L73.8 OTHER SPECIFIED FOLLICULAR DISORDERS: ICD-10-CM

## 2023-10-13 DIAGNOSIS — L81.4 OTHER MELANIN HYPERPIGMENTATION: ICD-10-CM

## 2023-10-13 DIAGNOSIS — L82.1 OTHER SEBORRHEIC KERATOSIS: ICD-10-CM

## 2023-10-13 DIAGNOSIS — L57.8 OTHER SKIN CHANGES DUE TO CHRONIC EXPOSURE TO NONIONIZING RADIATION: ICD-10-CM

## 2023-10-13 DIAGNOSIS — R20.2 PARESTHESIA OF SKIN: ICD-10-CM

## 2023-10-13 DIAGNOSIS — Z87.2 PERSONAL HISTORY OF DISEASES OF THE SKIN AND SUBCUTANEOUS TISSUE: ICD-10-CM

## 2023-10-13 DIAGNOSIS — D22 MELANOCYTIC NEVI: ICD-10-CM

## 2023-10-13 PROBLEM — D22.61 MELANOCYTIC NEVI OF RIGHT UPPER LIMB, INCLUDING SHOULDER: Status: ACTIVE | Noted: 2023-10-13

## 2023-10-13 PROBLEM — D22.5 MELANOCYTIC NEVI OF TRUNK: Status: ACTIVE | Noted: 2023-10-13

## 2023-10-13 PROCEDURE — ? COUNSELING

## 2023-10-13 PROCEDURE — ? OBSERVATION

## 2023-10-13 PROCEDURE — 99213 OFFICE O/P EST LOW 20 MIN: CPT

## 2023-10-13 ASSESSMENT — LOCATION DETAILED DESCRIPTION DERM
LOCATION DETAILED: RIGHT SUPERIOR MEDIAL MIDBACK
LOCATION DETAILED: LEFT LATERAL SUPERIOR CHEST
LOCATION DETAILED: LEFT RIB CAGE
LOCATION DETAILED: RIGHT SUPERIOR UPPER BACK
LOCATION DETAILED: LEFT INFERIOR UPPER BACK
LOCATION DETAILED: RIGHT POSTERIOR SHOULDER
LOCATION DETAILED: RIGHT LATERAL UPPER BACK

## 2023-10-13 ASSESSMENT — LOCATION SIMPLE DESCRIPTION DERM
LOCATION SIMPLE: LEFT UPPER BACK
LOCATION SIMPLE: ABDOMEN
LOCATION SIMPLE: CHEST
LOCATION SIMPLE: RIGHT SHOULDER
LOCATION SIMPLE: RIGHT UPPER BACK
LOCATION SIMPLE: RIGHT LOWER BACK

## 2023-10-13 ASSESSMENT — LOCATION ZONE DERM
LOCATION ZONE: ARM
LOCATION ZONE: TRUNK

## 2024-10-15 ENCOUNTER — APPOINTMENT (RX ONLY)
Dept: URBAN - METROPOLITAN AREA CLINIC 176 | Facility: CLINIC | Age: 59
Setting detail: DERMATOLOGY
End: 2024-10-15

## 2024-10-15 VITALS — WEIGHT: 173 LBS | HEIGHT: 68 IN

## 2024-10-15 DIAGNOSIS — L81.4 OTHER MELANIN HYPERPIGMENTATION: ICD-10-CM

## 2024-10-15 DIAGNOSIS — L82.1 OTHER SEBORRHEIC KERATOSIS: ICD-10-CM

## 2024-10-15 DIAGNOSIS — D22 MELANOCYTIC NEVI: ICD-10-CM

## 2024-10-15 DIAGNOSIS — R20.2 PARESTHESIA OF SKIN: ICD-10-CM

## 2024-10-15 DIAGNOSIS — L57.8 OTHER SKIN CHANGES DUE TO CHRONIC EXPOSURE TO NONIONIZING RADIATION: ICD-10-CM

## 2024-10-15 DIAGNOSIS — L82.0 INFLAMED SEBORRHEIC KERATOSIS: ICD-10-CM

## 2024-10-15 DIAGNOSIS — Z87.2 PERSONAL HISTORY OF DISEASES OF THE SKIN AND SUBCUTANEOUS TISSUE: ICD-10-CM

## 2024-10-15 DIAGNOSIS — L73.8 OTHER SPECIFIED FOLLICULAR DISORDERS: ICD-10-CM

## 2024-10-15 PROBLEM — D22.61 MELANOCYTIC NEVI OF RIGHT UPPER LIMB, INCLUDING SHOULDER: Status: ACTIVE | Noted: 2024-10-15

## 2024-10-15 PROBLEM — D22.5 MELANOCYTIC NEVI OF TRUNK: Status: ACTIVE | Noted: 2024-10-15

## 2024-10-15 PROCEDURE — ? OBSERVATION

## 2024-10-15 PROCEDURE — ? LIQUID NITROGEN

## 2024-10-15 PROCEDURE — 17110 DESTRUCTION B9 LES UP TO 14: CPT

## 2024-10-15 PROCEDURE — ? COUNSELING

## 2024-10-15 PROCEDURE — 99213 OFFICE O/P EST LOW 20 MIN: CPT | Mod: 25

## 2024-10-15 ASSESSMENT — LOCATION DETAILED DESCRIPTION DERM
LOCATION DETAILED: RIGHT SUPERIOR MEDIAL MIDBACK
LOCATION DETAILED: RIGHT LATERAL UPPER BACK
LOCATION DETAILED: RIGHT POSTERIOR SHOULDER
LOCATION DETAILED: LEFT MEDIAL SUPERIOR CHEST
LOCATION DETAILED: RIGHT SUPERIOR UPPER BACK
LOCATION DETAILED: LEFT RIB CAGE
LOCATION DETAILED: LEFT INFERIOR UPPER BACK
LOCATION DETAILED: LEFT LATERAL SUPERIOR CHEST

## 2024-10-15 ASSESSMENT — LOCATION ZONE DERM
LOCATION ZONE: TRUNK
LOCATION ZONE: ARM

## 2024-10-15 ASSESSMENT — LOCATION SIMPLE DESCRIPTION DERM
LOCATION SIMPLE: ABDOMEN
LOCATION SIMPLE: RIGHT UPPER BACK
LOCATION SIMPLE: RIGHT LOWER BACK
LOCATION SIMPLE: RIGHT SHOULDER
LOCATION SIMPLE: LEFT UPPER BACK
LOCATION SIMPLE: CHEST

## 2024-10-15 NOTE — PROCEDURE: LIQUID NITROGEN
Render Post-Care Instructions In Note?: yes
Medical Necessity Information: It is in your best interest to select a reason for this procedure from the list below. All of these items fulfill various CMS LCD requirements except the new and changing color options.
Number Of Freeze-Thaw Cycles: 1 freeze-thaw cycle
Spray Paint Technique: No
Detail Level: Detailed
Medical Necessity Clause: This procedure was medically necessary because the lesions that were treated were:
Duration Of Freeze Thaw-Cycle (Seconds): 2
Post-Care Instructions: I reviewed with the patient in detail post-care instructions. Patient is to wear sunprotection, and avoid picking at any of the treated lesions. Pt may apply Vaseline to crusted or scabbing areas.
Spray Paint Text: The liquid nitrogen was applied to the skin utilizing a spray paint frosting technique.
Consent: The patient's consent was obtained including but not limited to risks of crusting, scabbing, blistering, scarring, darker or lighter pigmentary change, recurrence, incomplete removal and infection.

## 2025-08-15 ENCOUNTER — APPOINTMENT (OUTPATIENT)
Dept: URBAN - METROPOLITAN AREA CLINIC 176 | Facility: CLINIC | Age: 60
Setting detail: DERMATOLOGY
End: 2025-08-15

## 2025-08-15 VITALS — WEIGHT: 177 LBS | HEIGHT: 68 IN

## 2025-08-15 DIAGNOSIS — Z87.2 PERSONAL HISTORY OF DISEASES OF THE SKIN AND SUBCUTANEOUS TISSUE: ICD-10-CM

## 2025-08-15 DIAGNOSIS — R20.2 PARESTHESIA OF SKIN: ICD-10-CM

## 2025-08-15 DIAGNOSIS — L82.1 OTHER SEBORRHEIC KERATOSIS: ICD-10-CM

## 2025-08-15 DIAGNOSIS — L71.0 PERIORAL DERMATITIS: ICD-10-CM

## 2025-08-15 DIAGNOSIS — L73.8 OTHER SPECIFIED FOLLICULAR DISORDERS: ICD-10-CM

## 2025-08-15 DIAGNOSIS — L81.4 OTHER MELANIN HYPERPIGMENTATION: ICD-10-CM

## 2025-08-15 DIAGNOSIS — L57.8 OTHER SKIN CHANGES DUE TO CHRONIC EXPOSURE TO NONIONIZING RADIATION: ICD-10-CM

## 2025-08-15 DIAGNOSIS — D22 MELANOCYTIC NEVI: ICD-10-CM

## 2025-08-15 PROBLEM — D22.5 MELANOCYTIC NEVI OF TRUNK: Status: ACTIVE | Noted: 2025-08-15

## 2025-08-15 PROBLEM — D22.61 MELANOCYTIC NEVI OF RIGHT UPPER LIMB, INCLUDING SHOULDER: Status: ACTIVE | Noted: 2025-08-15

## 2025-08-15 PROCEDURE — ? COUNSELING

## 2025-08-15 PROCEDURE — ? PRESCRIPTION MEDICATION MANAGEMENT

## 2025-08-15 PROCEDURE — ? OBSERVATION

## 2025-08-15 PROCEDURE — ? PRESCRIPTION

## 2025-08-15 RX ORDER — TRIAMCINOLONE ACETONIDE 1 MG/G
CREAM TOPICAL BID
Qty: 30 | Refills: 0 | Status: ERX | COMMUNITY
Start: 2025-08-15

## 2025-08-15 RX ORDER — METRONIDAZOLE 7.5 MG/G
1 CREAM TOPICAL DAILY
Qty: 45 | Refills: 2 | Status: ERX | COMMUNITY
Start: 2025-08-15

## 2025-08-15 RX ADMIN — TRIAMCINOLONE ACETONIDE: 1 CREAM TOPICAL at 00:00

## 2025-08-15 RX ADMIN — METRONIDAZOLE 1: 7.5 CREAM TOPICAL at 00:00

## 2025-08-15 ASSESSMENT — LOCATION DETAILED DESCRIPTION DERM
LOCATION DETAILED: MIDDLE STERNUM
LOCATION DETAILED: LEFT LATERAL SUPERIOR CHEST
LOCATION DETAILED: RIGHT LATERAL UPPER BACK
LOCATION DETAILED: RIGHT MEDIAL UPPER BACK
LOCATION DETAILED: RIGHT MEDIAL BUCCAL CHEEK
LOCATION DETAILED: LEFT DISTAL POSTERIOR UPPER ARM
LOCATION DETAILED: LEFT RIB CAGE
LOCATION DETAILED: RIGHT SUPERIOR MEDIAL MIDBACK
LOCATION DETAILED: RIGHT SUPERIOR UPPER BACK
LOCATION DETAILED: LEFT MEDIAL BUCCAL CHEEK
LOCATION DETAILED: RIGHT POSTERIOR SHOULDER
LOCATION DETAILED: LEFT INFERIOR UPPER BACK

## 2025-08-15 ASSESSMENT — LOCATION SIMPLE DESCRIPTION DERM
LOCATION SIMPLE: RIGHT SHOULDER
LOCATION SIMPLE: LEFT UPPER BACK
LOCATION SIMPLE: LEFT CHEEK
LOCATION SIMPLE: RIGHT CHEEK
LOCATION SIMPLE: LEFT POSTERIOR UPPER ARM
LOCATION SIMPLE: RIGHT UPPER BACK
LOCATION SIMPLE: CHEST
LOCATION SIMPLE: ABDOMEN
LOCATION SIMPLE: RIGHT LOWER BACK

## 2025-08-15 ASSESSMENT — LOCATION ZONE DERM
LOCATION ZONE: ARM
LOCATION ZONE: FACE
LOCATION ZONE: TRUNK